# Patient Record
Sex: MALE | Race: WHITE | NOT HISPANIC OR LATINO | Employment: UNEMPLOYED | URBAN - NONMETROPOLITAN AREA
[De-identification: names, ages, dates, MRNs, and addresses within clinical notes are randomized per-mention and may not be internally consistent; named-entity substitution may affect disease eponyms.]

---

## 2023-10-20 ENCOUNTER — HOSPITAL ENCOUNTER (EMERGENCY)
Facility: HOSPITAL | Age: 43
Discharge: HOME | End: 2023-10-20
Attending: EMERGENCY MEDICINE
Payer: MEDICAID

## 2023-10-20 VITALS
RESPIRATION RATE: 16 BRPM | HEART RATE: 82 BPM | HEIGHT: 78 IN | BODY MASS INDEX: 31.47 KG/M2 | WEIGHT: 272 LBS | TEMPERATURE: 98.6 F | SYSTOLIC BLOOD PRESSURE: 130 MMHG | OXYGEN SATURATION: 100 % | DIASTOLIC BLOOD PRESSURE: 82 MMHG

## 2023-10-20 DIAGNOSIS — R44.0 AUDITORY HALLUCINATION: Primary | ICD-10-CM

## 2023-10-20 PROCEDURE — 99284 EMERGENCY DEPT VISIT MOD MDM: CPT

## 2023-10-20 NOTE — DISCHARGE INSTRUCTIONS
Please follow-up with outpatient mental health as scheduled.  Follow-up with ER for new or worse symptoms.

## 2023-10-20 NOTE — ED PROVIDER NOTES
"Subjective:  History of Present Illness:    Is a 43-year-old male who is with personal medical history of substance use.  Presents to the ER today for psych evaluation.  Reports that he has been hearing voices for several years.  Reports that has recently gotten worse.  These voices do not tell him to do anything specifically.  Denies HI or SI.  Denies OTC medication or home remedy.  Denies alleviating or exacerbating factors.    Nurses Notes reviewed and agree, including vitals, allergies, social history and prior medical history.     REVIEW OF SYSTEMS: All systems reviewed and not pertinent unless noted.  Review of Systems   Psychiatric/Behavioral:  Positive for hallucinations.    All other systems reviewed and are negative.      History reviewed. No pertinent past medical history.    Allergies:    Patient has no known allergies.      History reviewed. No pertinent surgical history.      Social History     Socioeconomic History    Marital status: Single   Tobacco Use    Smoking status: Former     Types: Cigarettes    Smokeless tobacco: Never   Vaping Use    Vaping Use: Former    Substances: Nicotine   Substance and Sexual Activity    Alcohol use: Not Currently     Comment: last drink 7 years ago    Drug use: Yes     Frequency: 1.0 times per week     Types: Methamphetamines, IV, Marijuana     Comment: last use one month ago    Sexual activity: Yes     Partners: Female         History reviewed. No pertinent family history.    Objective  Physical Exam:  /82 (BP Location: Left arm, Patient Position: Sitting)   Pulse 82   Temp 98.6 °F (37 °C) (Oral)   Resp 16   Ht 200.7 cm (79\")   Wt 123 kg (272 lb)   SpO2 100%   BMI 30.64 kg/m²      Physical Exam  Vitals and nursing note reviewed.   Constitutional:       Appearance: Normal appearance. He is normal weight.   HENT:      Head: Normocephalic and atraumatic.      Nose: Nose normal.      Mouth/Throat:      Mouth: Mucous membranes are moist.      Pharynx: " Oropharynx is clear.   Eyes:      Extraocular Movements: Extraocular movements intact.      Conjunctiva/sclera: Conjunctivae normal.      Pupils: Pupils are equal, round, and reactive to light.   Cardiovascular:      Rate and Rhythm: Normal rate and regular rhythm.   Pulmonary:      Effort: Pulmonary effort is normal.      Breath sounds: Normal breath sounds.   Abdominal:      General: Abdomen is flat. Bowel sounds are normal.      Palpations: Abdomen is soft.   Musculoskeletal:         General: Normal range of motion.      Cervical back: Normal range of motion and neck supple.   Skin:     General: Skin is warm and dry.      Capillary Refill: Capillary refill takes less than 2 seconds.   Neurological:      General: No focal deficit present.      Mental Status: He is alert and oriented to person, place, and time. Mental status is at baseline.   Psychiatric:         Mood and Affect: Mood normal.         Behavior: Behavior normal.         Thought Content: Thought content normal.         Judgment: Judgment normal.         Procedures    ED Course:         Lab Results (last 24 hours)       ** No results found for the last 24 hours. **             No radiology results from the last 24 hrs       MDM      Initial impression of presenting illness: Is a 43-year-old male who is with personal medical history of substance use.  Presents to the ER today for psych evaluation.  Reports that he has been hearing voices for several years.  Reports that has recently gotten worse.  These voices do not tell him to do anything specifically.  Denies HI or SI.  Denies OTC medication or home remedy.  Denies alleviating or exacerbating factors.    DDX: includes but is not limited to: Visual hallucination, auditory hallucination, schizophrenia or other    Patient arrives stable with vitals interpreted by myself.     Pertinent features from physical exam: Physical examination essentially unremarkable.  Lung sounds are clear bilaterally  throughout abdomen soft nontender.  Bowel sounds normal.  Cardiac sounds are normal..    Initial diagnostic plan: N/A    Results from initial plan were reviewed and interpreted by me revealing N/A    Diagnostic information from other sources: Review    Interventions / Re-evaluation: Vital signs stable throughout encounter.    Results/clinical rationale were discussed with patient    Consultations/Discussion of results with other physicians: Patient was seen by behavioral health specialist while in ER.  Have set up outpatient appointment.    Disposition plan: Patient is hemodynamically stable nontoxic-appearing and appropriate for discharge.  We will send patient home.  He does have follow-up with outpatient mental health.  He is not currently HI or SI.  We will have him follow-up with his PCP in 1 week.  ER for new or worsening symptoms.  -----        Final diagnoses:   Auditory hallucination          David Perez, APRN  10/20/23 [id number]

## 2023-10-20 NOTE — CONSULTS
Elmer Butterfield  1980    Race/Ethnicity: White or   Martial Status: Single  Guardian Name/Contact/etc: self  Pt Lives With:  alone  Occupation:    Appearance: clean and casually dressed, appropriate     Time Called for Assessment: 13:20  Assessment Start and End: 13:45-14:00      DATA:   Clinician received a call from [facility] staff for a behavioral health consult.  The patient is agreeable to speak with the behavioral health team.  Met with patient at bedside. Patient is not under 1:1 security monitoring.  The attending treatment team is Christo RN and David KELLY, Arik.  Patient presents today with chief compliant of paranoia.  Patient here for a psych evaluation for paranoia. Patient describes that he has been hearing voices that sound like his voice and he believes this to be his subconscious. Patient explains that he has used meth in the past but not recently. Patient denies SI/HI and AVH during assessment. Patient was given information for outpatient resources.  Clinician completed assessment with patient and observations are documented as follows.    ASSESSMENT:    Clinician consulted with patient for mental status exam and assessment.  Clinical descriptors are documented as follows.  Clinician completed CSSRS with patient for suicide risk assessment.  The results of patient’s CSSRS documented as follows.    Presenting Problems:  paranoia  Current Stressors: mental health condition and relationship concerns     Established Therapy, Medication Management or Other Mental Health Services: none   Current Psychiatric Medications: none       Mental Status Exam:  Behavior: Appropriate  Psychomotor Movement: Appropriate  Attention and Cooperation: Normal and Cooperative  Mood: appropriate to circumstances and Affect: Appropriate  Orientation: alert and oriented to person, place, and time   Thought Process: tangential  Thought Content: paranoid  Delusions:  none    Hallucinations:  "Auditory  - hears \"my voice and my mother voice\"  Concentration: Normal  Suicidal Ideation: Absent  Homicidal Ideation: Absent  Hopelessness: no  Speech: Rapid  Eye Contact: Good  Insight: good  Judgement: good    Depression:  did not rate on scale    Anxiety:  did not rate on scale  Sleep: Good   Appetite: Good       Hx of Psychiatric or Detox Hospitalizations:  No  Most recent inpatient admission: n/a    Suicidal Ideation Assessment:    COLUMBIA-SUICIDE SEVERITY RATING SCALE  Psychiatric Inpatient Setting - Discharge Screener    Ask questions that are bold and underlined Discharge   Ask Questions 1 and 2 YES NO   Wish to be Dead:   Person endorses thoughts about a wish to be dead or not alive anymore, or wish to fall asleep and not wake up.  While you were here in the hospital, have you wished you were dead or wished you could go to sleep and not wake up?  x   Suicidal Thoughts:   General non-specific thoughts of wanting to end one's life/die by suicide, “I've thought about killing myself” without general thoughts of ways to kill oneself/associated methods, intent, or plan.   While you were here in the hospital, have you actually had thoughts about killing yourself?   x   If YES to 2, ask questions 3, 4, 5, and 6.  If NO to 2, go directly to question 6   3) Suicidal Thoughts with Method (without Specific Plan or Intent to Act):   Person endorses thoughts of suicide and has thought of a least one method during the assessment period. This is different than a specific plan with time, place or method details worked out. “I thought about taking an overdose but I never made a specific plan as to when where or how I would actually do it….and I would never go through with it.”   Have you been thinking about how you might kill yourself?      4) Suicidal Intent (without Specific Plan):   Active suicidal thoughts of killing oneself and patient reports having some intent to act on such thoughts, as opposed to “I have the " thoughts but I definitely will not do anything about them.”   Have you had these thoughts and had some intention of acting on them or do you have some intention of acting on them after you leave the hospital?      5) Suicide Intent with Specific Plan:   Thoughts of killing oneself with details of plan fully or partially worked out and person has some intent to carry it out.   Have you started to work out or worked out the details of how to kill yourself either for while you were here in the hospital or for after you leave the hospital? Do you intend to carry out this plan?        6) Suicide Behavior    While you were here in the hospital, have you done anything, started to do anything, or prepared to do anything to end your life?    Examples: Took pills, cut yourself, tried to hang yourself, took out pills but didn't swallow any because you changed your mind or someone took them from you, collected pills, secured a means of obtaining a gun, gave away valuables, wrote a will or suicide note, etc.  x     Suicidal: Absent   Previous Attempts:  none  Most Recent Attempt: n/a    Psychosocial History    Highest Level of Education:  unknown to clinician    Family Hx of Mental Health/Substance Abuse: No  Patient Trauma/Abuse History: Further details: did not disclose     Does this require reporting: N/A  Patient Identified Support System (List family members, loved ones, guardians, friends, etc): girlfriend    Legal History / History of Violence: None known   Experience with Interpersonal Violence: No  History of Inappropriate Sexual Behavior: No  Current Medical Conditions or Biomedical Complications: No (If yes, explain/list)    Social Determinants of Health  Housing Instability and/or Utility Needs: No  Food Insecurity: No  Transportation Needs: No    Substance Use History  Active Use: No  History of Use: Patient reports a history of meth use.   PLAN:  At this time, clinician recommends outpatient treatment based upon  the above assessment.   Clinician collaborated with the treatment team who agree to adopt the recommendations.  Clinician discussed recommendations with patient and/or patient support systems, and patient is agreeable to the plan.      Have the levels of care been discussed with the patient? Yes  Level of care recommendation: outpatient  Is patient agreeable to treatment? Yes    Clinician verbally engaged in safety planning by assisting the patient in identifying risk factors that would indicate the need for higher level of care, such as thoughts to harm self or others and/or self-harming behavior(s). Safety plan of report to nearest hospital, calling local police or 911 if feeling unsafe, if having suicidal or homicidal thoughts, or if in emergent need of medications verbally reviewed with patient during assessment and suicide prevention/crisis hotlines verbally reviewed with patient during assessment. Patient during assessment verbally agreed to safety plan. Reviewed resources of crisis hotlines or presenting to the nearest emergency department should symptoms worsen, or in any crisis/emergency. Patient agreeable and voiced understanding.       Jerri Felder, CSW, MSW

## 2023-12-11 ENCOUNTER — OFFICE VISIT (OUTPATIENT)
Dept: PSYCHIATRY | Facility: CLINIC | Age: 43
End: 2023-12-11

## 2023-12-11 VITALS
BODY MASS INDEX: 31.47 KG/M2 | SYSTOLIC BLOOD PRESSURE: 124 MMHG | HEART RATE: 78 BPM | WEIGHT: 272 LBS | DIASTOLIC BLOOD PRESSURE: 86 MMHG | OXYGEN SATURATION: 99 % | HEIGHT: 78 IN

## 2023-12-11 DIAGNOSIS — F41.1 GENERALIZED ANXIETY DISORDER: ICD-10-CM

## 2023-12-11 DIAGNOSIS — F90.2 ADHD (ATTENTION DEFICIT HYPERACTIVITY DISORDER), COMBINED TYPE: Primary | ICD-10-CM

## 2023-12-11 NOTE — PROGRESS NOTES
Subjective   Elmer Butterfield is a 43 y.o. adult who presents today for initial evaluation     Chief Complaint:  ADHD    History of Present Illness:   History of Present Illness  Elmer Butterfield presents to [facility] for initial evaluation.  Voices interest in appointment for counseling and medication to help with ADHD symptoms. Reports history of ADHD that was diagnosed around middle school.  Says that he struggled throughout school with maintaining focus, concentration and often made bad grades. Says that he currently experiences anxiety and symptoms of ADHD that have negatively impacted his life. Says that he is always nervous, anxious, on edge, worries and over thinks situations. Denies any current symptoms of depression, but says that depressive symptoms do come and go on occasion. Says that his biggest issue is maintaining focus and concentration, struggles to initiate or complete tasks. Has trouble watching a movie as he becomes easily distracted.  Voices interest in ADHD treatment as he feels that his symptoms will negatively impact his work as a . Reports that he is sleeping well and appetite is good.  Denies any current SI/HI or AVH. PHQ-9 total score: 13, GIGI-7 total score: 14.    Past Psychiatric History: Diagnosed with ADHD in [employer].  Anxiety and depression have been present for several years.  Denies any inpatient admissions for mental health issues. Denies any past suicide attempts, self-harming behaviors, SI/HI. 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    Previous Psych Meds: Denies any past psychiatric medications    Substance Use/Abuse: Current THC use (2-3 joints per day).  Began using THC around 13 to 14 years old, increased THC use around age 19. Began using methamphetamine approximately 7 years ago.  Spent 8 months in sober living about 1 year ago.  Reports 1 relapse with Meth  "about 3 months ago.    Past Medical History: Denies any significant past medical history    Family Psychiatric History: Denies any known family psychiatric history.  Reports father extremely paranoid, but no diagnosis    Social History: Lives in his own trailer with 6 cats.  Currently unemployed, living off assets he obtained after father's death. 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  Grew up in [place]. Father kicked him out after mother's death, was homeless x 5 years. Moved to [place] to live with brother around age 29-29. Moved to [place] about 7 months ago. Says that he was always \"the black sheep of the family.\" Parents paid for siblings college expenses, but refused to pay the price for him to pursue being .      The following portions of the patient's history were reviewed and updated as appropriate: allergies, current medications, past family history, past medical history, past social history, past surgical history and problem list.      Past Medical History:  History reviewed. No pertinent past medical history.    Social History:  Social History     Socioeconomic History    Marital status: Single   Tobacco Use    Smoking status: Every Day     Packs/day: 1     Types: Cigarettes     Passive exposure: Past    Smokeless tobacco: Never   Vaping Use    Vaping Use: Some days    Substances: Nicotine, Flavoring    Devices: Disposable    Passive vaping exposure: Yes   Substance and Sexual Activity    Alcohol use: Not Currently     Comment: last drink 7 years ago    Drug use: Yes     Frequency: 1.0 times per week     Types: Methamphetamines, IV, Marijuana     Comment: last use three months ago 12/11/2023    Sexual activity: Yes     Partners: Female       Family History:  History reviewed. No pertinent family history.    Past Surgical History:  History reviewed. No pertinent surgical history.    Problem List:  There is no problem list on file for this patient.      Allergy:   No Known " "Allergies     Current Medications:   No current outpatient medications on file.     No current facility-administered medications for this visit.     Review of Systems   Constitutional:  Negative for activity change, appetite change, fatigue, unexpected weight gain and unexpected weight loss.   Respiratory:  Negative for shortness of breath.    Cardiovascular:  Negative for chest pain.   Psychiatric/Behavioral:  Positive for decreased concentration, dysphoric mood and sleep disturbance. Negative for suicidal ideas. The patient is nervous/anxious.      Physical Exam  Vitals reviewed.   Constitutional:       General: He is not in acute distress.     Appearance: Normal appearance.   Neurological:      Mental Status: He is alert.      Gait: Gait normal.     Vitals:   Blood pressure 124/86, pulse 78, height 200.7 cm (79\"), weight 123 kg (272 lb), SpO2 99%.    Mental Status Exam:   Hygiene:   good  Cooperation:  Cooperative  Eye Contact:  Good  Psychomotor Behavior:  Restless  Affect:  Full range and Appropriate  Mood: normal  Hopelessness: Denies  Speech:  Normal  Thought Process:  Goal directed and Linear  Thought Content:  Mood congruent  Suicidal:  None  Homicidal:  None  Hallucinations:  None  Delusion:  None  Memory:  Intact  Orientation:  Person, Place, Time, and Situation  Reliability:  good  Insight:  Fair  Judgement:  Fair  Impulse Control:  Fair    Lab Results:   No results found for any previous visit.       EKG Results:  No orders to display       Assessment & Plan   Problems Addressed this Visit    None  Visit Diagnoses       ADHD (attention deficit hyperactivity disorder), combined type    -  Primary    Generalized anxiety disorder              Diagnoses         Codes Comments    ADHD (attention deficit hyperactivity disorder), combined type    -  Primary ICD-10-CM: F90.2  ICD-9-CM: 314.01     Generalized anxiety disorder     ICD-10-CM: F41.1  ICD-9-CM: 300.02             Visit Diagnoses:    ICD-10-CM " ICD-9-CM   1. ADHD (attention deficit hyperactivity disorder), combined type  F90.2 314.01   2. Generalized anxiety disorder  F41.1 300.02     Elmer presents today for initial evaluation to discuss possible medication options for ADHD. Has history of ADHD that was diagnosed during adolescence. Also struggles with anxiety and intermittent depression. Discussed possible medication options. Also discussed daily THC use and potential for continued use to exacerbate ADHD symptoms, anxiety and depression. Agreeable to consider medication options, but voices interest in decreasing/stopping THC to determine severity of symptoms. Discussed Wellbutrin as option to help with management of symptoms, we will consider medication.  Will make appointment for next available with counseling to help with development of coping skills to better manage symptoms.    -Reviewed previous available documentation and most recent available labs.   BLADIMIR reviewed and is appropriate.     GOALS:  Short Term Goals: Patient will be compliant with medication, and patient will have no significant medication related side effects.  Patient will be engaged in psychotherapy as indicated.  Patient will report subjective improvement of symptoms.  Long term goals: To stabilize mood and treat/improve subjective symptoms, the patient will stay out of the hospital, the patient will be at an optimal level of functioning, and the patient will take all medications as prescribed.  The patient/guardian verbalized understanding and agreement with goals that were mutually set.    TREATMENT PLAN: Continue supportive psychotherapy efforts and medications as indicated for patient's diagnosis.  Pharmacological and Non-Pharmacological treatment options discussed during today's visit. Patient/Guardian acknowledged and verbally consented with current treatment plan and was educated on the importance of compliance with treatment and follow-up appointments.      MEDICATION  ISSUES:  Discussed medication options and treatment plan of prescribed medication as well as the risks, benefits, any black box warnings, and side effects including potential falls, possible impaired driving, and metabolic adversities among others. Patient is agreeable to call the office with any worsening of symptoms or onset of side effects, or if any concerns or questions arise.  The contact information for the office is made available to the patient. Patient is agreeable to call 911 or go to the nearest ER should they begin having any SI/HI, or if any urgent concerns arise. No medication side effects or related complaints today.     MEDS ORDERED DURING VISIT:  No orders of the defined types were placed in this encounter.      FOLLOW UP:  Return if symptoms worsen or fail to improve, for Recheck.             This document has been electronically signed by LETIICA Deleon  December 26, 2023 17:46 EST    Please note that portions of this note were completed with a voice recognition program. Efforts were made to edit dictation, but occasionally words are mistranscribed.